# Patient Record
Sex: MALE | NOT HISPANIC OR LATINO
[De-identification: names, ages, dates, MRNs, and addresses within clinical notes are randomized per-mention and may not be internally consistent; named-entity substitution may affect disease eponyms.]

---

## 2023-06-22 ENCOUNTER — FORM ENCOUNTER (OUTPATIENT)
Age: 68
End: 2023-06-22

## 2023-06-23 ENCOUNTER — APPOINTMENT (OUTPATIENT)
Dept: ORTHOPEDIC SURGERY | Facility: CLINIC | Age: 68
End: 2023-06-23
Payer: MEDICARE

## 2023-06-23 ENCOUNTER — APPOINTMENT (OUTPATIENT)
Dept: MRI IMAGING | Facility: CLINIC | Age: 68
End: 2023-06-23
Payer: MEDICARE

## 2023-06-23 VITALS — WEIGHT: 245 LBS | HEIGHT: 71 IN | BODY MASS INDEX: 34.3 KG/M2

## 2023-06-23 DIAGNOSIS — M23.91 UNSPECIFIED INTERNAL DERANGEMENT OF RIGHT KNEE: ICD-10-CM

## 2023-06-23 DIAGNOSIS — Z86.69 PERSONAL HISTORY OF OTHER DISEASES OF THE NERVOUS SYSTEM AND SENSE ORGANS: ICD-10-CM

## 2023-06-23 DIAGNOSIS — Z00.00 ENCOUNTER FOR GENERAL ADULT MEDICAL EXAMINATION W/OUT ABNORMAL FINDINGS: ICD-10-CM

## 2023-06-23 DIAGNOSIS — E11.9 TYPE 2 DIABETES MELLITUS W/OUT COMPLICATIONS: ICD-10-CM

## 2023-06-23 PROCEDURE — 73564 X-RAY EXAM KNEE 4 OR MORE: CPT | Mod: RT

## 2023-06-23 PROCEDURE — 73721 MRI JNT OF LWR EXTRE W/O DYE: CPT | Mod: RT,MH

## 2023-06-23 PROCEDURE — 99204 OFFICE O/P NEW MOD 45 MIN: CPT

## 2023-06-23 RX ORDER — METFORMIN HYDROCHLORIDE 500 MG/1
500 TABLET, COATED ORAL
Refills: 0 | Status: ACTIVE | COMMUNITY

## 2023-06-23 NOTE — DISCUSSION/SUMMARY
[de-identified] : Assessment: The patient is a 67 year old male with [] and physical exam findings consistent with [].\par \par Patient and I discussed their symptoms. Discussed findings of today's exam and possible causes of patient's pain. Educated patient on their most probable diagnosis. Reviewed possible courses of treatment, and we collaboratively decided best course of treatment at this time will include:\par \par 1. MRI R knee to eval MMT \par \par \par The patient's current medication management of their orthopedic diagnosis was reviewed today: \par \par (1) We discussed a comprehensive treatment plan that included possible pharmaceutical management involving the use of prescription strength medications including but not limited to options such as oral Naprosyn 500mg BID, once daily Meloxicam 15 mg, or 500-650 mg Tylenol versus over the counter oral medications and topical prescription NSAID Pennsaid vs over the counter Voltaren gel. \par \par (2) There is a moderate risk of morbidity with further treatment, especially from use of prescription strength medications and possible side effects of these medications which include upset stomach with oral medications, skin reactions to topical medications and cardiac/renal issues with long term use. \par \par (3) I recommended that the patient follow-up with their medical physician to discuss any significant specific potential issues with long term medication use such as interactions with current medications or with exacerbation of underlying medical comorbidities. \par \par (4) The benefits and risks associated with use of injectable, oral or topical, prescription and over the counter anti-inflammatory medications were discussed with the patient. The patient voiced understanding of the risks including but not limited to bleeding, stroke, kidney dysfunction, heart disease, and were referred to the black box warning label for further information.\par \par Follow up after MRI.

## 2023-06-23 NOTE — IMAGING
[de-identified] : The patient is a well appearing 18 year old male of their stated age.\par Patient ambulates with a crutches.\par Negative straight leg raise bilateral\par \par Effected Knee:    RIGHT                	\par ROM:  0-120 degrees\par \par Lachman: Negative\par Pivot Shift: Negative\par Anterior Drawer: Negative\par Posterior Drawer / Sag: Negative\par Varus Stress 0 degrees: Stable\par Varus Stress 30 degrees: Stable\par Valgus Stress 0 degrees: Stable\par Valgus Stress 30 degrees: Stable\par Medial Ras: Positive\par Lateral Ras: Negative\par Patella Glide: 2+\par Patella Apprehension: Negative\par Patella Grind: Negative\par \par Palpation:\par Medial Joint Line: TTP\par Lateral Joint Line: Nontender\par Medial Collateral Ligament: Nontender\par Lateral Collateral Ligament/PLC: Nontender\par Distal Femur: Nontender\par Proximal Tibia: Nontender\par Tibial Tubercle: Nontender\par Distal Pole Patella: Nontender\par Quadriceps Tendon: Nontender &  Intact\par Patella Tendon: Nontender &  Intact\par Medial Distal Hamstring/PES: Nontender\par Lateral Distal Hamstring: Nontender & Stable\par Iliotibial Band: Nontender\par Medial Patellofemoral Ligament: Nontender\par Adductor: Nontender\par Proximal GSC-Plantaris: Nontender\par Calf: Supple & Nontender\par \par Inspection:\par Deformity: No\par Erythema: No\par Ecchymosis: No\par Abrasions: No\par Effusion: Moderate\par Prepatellar Bursitis: No\par \par Neurologic Exam:\par Sensation L4-S1: Grossly Intact\par \par Motor Exam:\par Quadriceps: 5 out of 5\par Hamstrings: 5 out of 5\par EHL: 5 out of 5\par FHL: 5 out of 5\par TA: 5 out of 5\par GS: 5 out of 5\par \par Circulatory/Pulses:\par Dorsalis Pedis: 2+\par Posterior Tibialis: 2+\par \par Additional Pertinent Findings: None\par \par Contralateral Knee:               	\par ROM: 0-130 degrees\par \par Other Pertinent Findings: None\par   [Right] : right knee [There are no fractures, subluxations or dislocations. No significant abnormalities are seen] : There are no fractures, subluxations or dislocations. No significant abnormalities are seen

## 2023-06-23 NOTE — HISTORY OF PRESENT ILLNESS
[8] : 8 [Dull/Aching] : dull/aching [Localized] : localized [Constant] : constant [Standing] : standing [Walking] : walking [Stairs] : stairs [Retired] : Work status: retired [de-identified] : 6/23/23: 66yo M with right knee pain for the past 3 weeks after he had twisted his knee while pushing a wagon through sand. It is constantly painful, but most bothersome with prolonged walking. Hx of L knee scope.  [] : Post Surgical Visit: no [FreeTextEntry1] : Rt knee [FreeTextEntry5] : Patient was being pushed in a wagon through sand, twisted his R knee 3 weeks ago, no prior hx, PT is using a cane

## 2023-06-23 NOTE — DISCUSSION/SUMMARY
[de-identified] : Assessment: The patient is a 67 year old male with [] and physical exam findings consistent with [].\par \par Patient and I discussed their symptoms. Discussed findings of today's exam and possible causes of patient's pain. Educated patient on their most probable diagnosis. Reviewed possible courses of treatment, and we collaboratively decided best course of treatment at this time will include:\par \par 1. MRI R knee to eval MMT \par \par \par The patient's current medication management of their orthopedic diagnosis was reviewed today: \par \par (1) We discussed a comprehensive treatment plan that included possible pharmaceutical management involving the use of prescription strength medications including but not limited to options such as oral Naprosyn 500mg BID, once daily Meloxicam 15 mg, or 500-650 mg Tylenol versus over the counter oral medications and topical prescription NSAID Pennsaid vs over the counter Voltaren gel. \par \par (2) There is a moderate risk of morbidity with further treatment, especially from use of prescription strength medications and possible side effects of these medications which include upset stomach with oral medications, skin reactions to topical medications and cardiac/renal issues with long term use. \par \par (3) I recommended that the patient follow-up with their medical physician to discuss any significant specific potential issues with long term medication use such as interactions with current medications or with exacerbation of underlying medical comorbidities. \par \par (4) The benefits and risks associated with use of injectable, oral or topical, prescription and over the counter anti-inflammatory medications were discussed with the patient. The patient voiced understanding of the risks including but not limited to bleeding, stroke, kidney dysfunction, heart disease, and were referred to the black box warning label for further information.\par \par Follow up after MRI.

## 2023-06-23 NOTE — IMAGING
[de-identified] : The patient is a well appearing 18 year old male of their stated age.\par Patient ambulates with a crutches.\par Negative straight leg raise bilateral\par \par Effected Knee:    RIGHT                	\par ROM:  0-120 degrees\par \par Lachman: Negative\par Pivot Shift: Negative\par Anterior Drawer: Negative\par Posterior Drawer / Sag: Negative\par Varus Stress 0 degrees: Stable\par Varus Stress 30 degrees: Stable\par Valgus Stress 0 degrees: Stable\par Valgus Stress 30 degrees: Stable\par Medial Ras: Positive\par Lateral Ras: Negative\par Patella Glide: 2+\par Patella Apprehension: Negative\par Patella Grind: Negative\par \par Palpation:\par Medial Joint Line: TTP\par Lateral Joint Line: Nontender\par Medial Collateral Ligament: Nontender\par Lateral Collateral Ligament/PLC: Nontender\par Distal Femur: Nontender\par Proximal Tibia: Nontender\par Tibial Tubercle: Nontender\par Distal Pole Patella: Nontender\par Quadriceps Tendon: Nontender &  Intact\par Patella Tendon: Nontender &  Intact\par Medial Distal Hamstring/PES: Nontender\par Lateral Distal Hamstring: Nontender & Stable\par Iliotibial Band: Nontender\par Medial Patellofemoral Ligament: Nontender\par Adductor: Nontender\par Proximal GSC-Plantaris: Nontender\par Calf: Supple & Nontender\par \par Inspection:\par Deformity: No\par Erythema: No\par Ecchymosis: No\par Abrasions: No\par Effusion: Moderate\par Prepatellar Bursitis: No\par \par Neurologic Exam:\par Sensation L4-S1: Grossly Intact\par \par Motor Exam:\par Quadriceps: 5 out of 5\par Hamstrings: 5 out of 5\par EHL: 5 out of 5\par FHL: 5 out of 5\par TA: 5 out of 5\par GS: 5 out of 5\par \par Circulatory/Pulses:\par Dorsalis Pedis: 2+\par Posterior Tibialis: 2+\par \par Additional Pertinent Findings: None\par \par Contralateral Knee:               	\par ROM: 0-130 degrees\par \par Other Pertinent Findings: None\par   [Right] : right knee [There are no fractures, subluxations or dislocations. No significant abnormalities are seen] : There are no fractures, subluxations or dislocations. No significant abnormalities are seen

## 2023-06-23 NOTE — HISTORY OF PRESENT ILLNESS
[8] : 8 [Dull/Aching] : dull/aching [Localized] : localized [Constant] : constant [Standing] : standing [Walking] : walking [Stairs] : stairs [Retired] : Work status: retired [de-identified] : 6/23/23: 66yo M with right knee pain for the past 3 weeks after he had twisted his knee while pushing a wagon through sand. It is constantly painful, but most bothersome with prolonged walking. Hx of L knee scope.  [] : Post Surgical Visit: no [FreeTextEntry1] : Rt knee [FreeTextEntry5] : Patient was being pushed in a wagon through sand, twisted his R knee 3 weeks ago, no prior hx, PT is using a cane

## 2023-06-26 ENCOUNTER — APPOINTMENT (OUTPATIENT)
Dept: ORTHOPEDIC SURGERY | Facility: CLINIC | Age: 68
End: 2023-06-26
Payer: MEDICARE

## 2023-06-26 PROCEDURE — 99443: CPT | Mod: 95

## 2023-06-30 ENCOUNTER — APPOINTMENT (OUTPATIENT)
Dept: ORTHOPEDIC SURGERY | Facility: CLINIC | Age: 68
End: 2023-06-30
Payer: MEDICARE

## 2023-06-30 DIAGNOSIS — M23.203 DERANGEMENT OF UNSPECIFIED MEDIAL MENISCUS DUE TO OLD TEAR OR INJURY, RIGHT KNEE: ICD-10-CM

## 2023-06-30 PROCEDURE — 99214 OFFICE O/P EST MOD 30 MIN: CPT | Mod: 25

## 2023-06-30 PROCEDURE — 20611 DRAIN/INJ JOINT/BURSA W/US: CPT | Mod: RT

## 2023-06-30 RX ORDER — ROSUVASTATIN CALCIUM 5 MG/1
TABLET, FILM COATED ORAL
Refills: 0 | Status: ACTIVE | COMMUNITY

## 2023-06-30 RX ORDER — GABAPENTIN 300 MG
300 TABLET ORAL
Refills: 0 | Status: ACTIVE | COMMUNITY

## 2023-06-30 RX ORDER — TELMISARTAN 20 MG/1
20 TABLET ORAL
Refills: 0 | Status: ACTIVE | COMMUNITY

## 2023-06-30 RX ORDER — OXYCODONE HYDROCHLORIDE 30 MG/1
TABLET ORAL
Refills: 0 | Status: ACTIVE | COMMUNITY

## 2023-06-30 RX ORDER — NEBIVOLOL 5 MG/1
5 TABLET ORAL
Refills: 0 | Status: ACTIVE | COMMUNITY

## 2023-07-03 NOTE — DISCUSSION/SUMMARY
[de-identified] : Assessment: The patient is a 67 year old male with Right Knee Pain and physical exam findings consistent with internal derangement \par \par Patient and I discussed their symptoms. Discussed findings of today's exam and possible causes of patient's pain. Educated patient on their most probable diagnosis. Reviewed possible courses of treatment, and we collaboratively decided best course of treatment at this time will include:\par \par 1. CSI Injection was done today, patient tolerated injection well\par 2. Apply ice to affected area, as needed\par 3. Start PT and HEP \par \par The patient's current medication management of their orthopedic diagnosis was reviewed today: \par \par (1) We discussed a comprehensive treatment plan that included possible pharmaceutical management involving the use of prescription strength medications including but not limited to options such as oral Naprosyn 500mg BID, once daily Meloxicam 15 mg, or 500-650 mg Tylenol versus over the counter oral medications and topical prescription NSAID Pennsaid vs over the counter Voltaren gel. \par \par (2) There is a moderate risk of morbidity with further treatment, especially from use of prescription strength medications and possible side effects of these medications which include upset stomach with oral medications, skin reactions to topical medications and cardiac/renal issues with long term use. \par \par (3) I recommended that the patient follow-up with their medical physician to discuss any significant specific potential issues with long term medication use such as interactions with current medications or with exacerbation of underlying medical comorbidities. \par \par (4) The benefits and risks associated with use of injectable, oral or topical, prescription and over the counter anti-inflammatory medications were discussed with the patient. The patient voiced understanding of the risks including but not limited to bleeding, stroke, kidney dysfunction, heart disease, and were referred to the black box warning label for further information.\par \par Prior to appointment and during encounter with patient extensive medical records were reviewed including but not limited to, hospital records, out patient records, imaging results, and lab data. During this appointment the patient was examined, diagnoses were discussed and explained in a face to face manner. In addition extensive time was spent reviewing aforementioned diagnostic studies. Counseling including abnormal image results, differential diagnoses, treatment options, risk and benefits, lifestyle changes, current condition, and current medications was performed. Patient's comments, questions, and concerns were address and patient verbalized understanding.\par \par Follow up in 3 months.

## 2023-07-03 NOTE — DATA REVIEWED
[FreeTextEntry1] : [facility] MRI RIGHT KNEE IMPRESSIONS: 06/23/23\par 1. Findings consistent with a recurrent tear involving the peripheral undersurface of the posterior horn and body of the medial meniscus, where there are areas of subchondral edema in the peripheral posterior medial compartment potentially related to stress reaction, contusions or degenerative subchondral edema with associated chondral loss and joint space narrowing in the medial compartment along the periphery and posteriorly.\par 2. Mild MCL sprain, mild medial patellofemoral ligament sprain and soft tissue swelling anteriorly and \par medially.\par 3. Chondral loss in the lateral and patellofemoral compartments with mild subchondral edema in the lateral \par patella and lateral femoral trochlea.\par 4. Effusion, synovitis and popliteal cyst.\par 5. Clinical correlation regarding prior surgery is recommended. Consider MR arthrogram to further evaluate as clinically indicated.

## 2023-07-03 NOTE — PROCEDURE
[FreeTextEntry3] : Patient Identification \par Name/: Verbal with patient and/or family \par  \par Procedure Verification: \par Procedure confirmed with patient or family/designee \par Consent for procedure: Verbal Consent Given \par Relevant documentation completed, reviewed, and signed \par Clinical indications for procedure confirmed \par  \par Time-out with all members of procedure team immediately prior to procedure: \par Correct patient identified. Agreement on procedure. Correct side and site. \par  \par ULTRASOUND GUIDED KNEE INJECTION (STEROID) - RIGHT\par After verbal consent and identification of the correct patient and correct site, the RIGHT superolateral knees were prepped using alcohol swabs and betadine. This was allowed time to air dry. A mixture of 1cc Celestone 6mg/ml, 2cc Lidocaine 1%, and 2cc Bupivacaine 0.5% was injected into the suprapatellar pouch using a sterile 22G needle with US after ethyl chloride spray for skin anesthesia. The patient tolerated the procedure well. After-care instructions were provided and included instructions to ice the area and to call if redness, pain, or fever develop. Visualization of the needle and placement of the injection was performed without any complications. Ultrasound was used for visualization, precise injection in area of tear, and / or prior failure or difficult injection.\par The risks, benefits, and alternatives to corticosteroid injection were reviewed with the patient. Risks outlined include but are not limited to infection, sepsis, bleeding, scarring, skin discoloration, temporary increase in pain, syncopal episode, failure to resolve symptoms, symptoms recurrence, allergic reaction, flare reaction, and elevation of blood sugar in diabetics. Patient understood the risks and asked to proceed with this treatment course.\par

## 2023-07-03 NOTE — HISTORY OF PRESENT ILLNESS
[8] : 8 [Dull/Aching] : dull/aching [Localized] : localized [Constant] : constant [Standing] : standing [Walking] : walking [Stairs] : stairs [Retired] : Work status: retired [de-identified] : 06/30/2023: MORGAN is presenting today for followup for Right Knee Pain. Pain and symptoms are the same from the last visit. He has been resting and limiting activities. He denies any numbness/tingling/fevers/chills.\par  [] : Post Surgical Visit: no [FreeTextEntry1] : Rt knee [FreeTextEntry5] : Patient was being pushed in a wagon through sand, twisted his R knee 3 weeks ago, no prior hx, PT is using a cane [de-identified] : NONE

## 2023-07-03 NOTE — IMAGING
[de-identified] : The patient is a well appearing 18 year old male of their stated age.\par Patient ambulates with a crutches.\par Negative straight leg raise bilateral\par \par Effected Knee:    RIGHT                	\par ROM:  0-120 degrees\par \par Lachman: Negative\par Pivot Shift: Negative\par Anterior Drawer: Negative\par Posterior Drawer / Sag: Negative\par Varus Stress 0 degrees: Stable\par Varus Stress 30 degrees: Stable\par Valgus Stress 0 degrees: Stable\par Valgus Stress 30 degrees: Stable\par Medial Ras: Positive\par Lateral Ras: Negative\par Patella Glide: 2+\par Patella Apprehension: Negative\par Patella Grind: Negative\par \par Palpation:\par Medial Joint Line: TTP\par Lateral Joint Line: Nontender\par Medial Collateral Ligament: Nontender\par Lateral Collateral Ligament/PLC: Nontender\par Distal Femur: Nontender\par Proximal Tibia: Nontender\par Tibial Tubercle: Nontender\par Distal Pole Patella: Nontender\par Quadriceps Tendon: Nontender &  Intact\par Patella Tendon: Nontender &  Intact\par Medial Distal Hamstring/PES: Nontender\par Lateral Distal Hamstring: Nontender & Stable\par Iliotibial Band: Nontender\par Medial Patellofemoral Ligament: Nontender\par Adductor: Nontender\par Proximal GSC-Plantaris: Nontender\par Calf: Supple & Nontender\par \par Inspection:\par Deformity: No\par Erythema: No\par Ecchymosis: No\par Abrasions: No\par Effusion: Moderate\par Prepatellar Bursitis: No\par \par Neurologic Exam:\par Sensation L4-S1: Grossly Intact\par \par Motor Exam:\par Quadriceps: 5 out of 5\par Hamstrings: 5 out of 5\par EHL: 5 out of 5\par FHL: 5 out of 5\par TA: 5 out of 5\par GS: 5 out of 5\par \par Circulatory/Pulses:\par Dorsalis Pedis: 2+\par Posterior Tibialis: 2+\par \par Additional Pertinent Findings: None\par \par Contralateral Knee:               	\par ROM: 0-130 degrees\par \par Other Pertinent Findings: None\par

## 2024-08-15 ENCOUNTER — APPOINTMENT (OUTPATIENT)
Dept: ORTHOPEDIC SURGERY | Facility: CLINIC | Age: 69
End: 2024-08-15
Payer: MEDICARE

## 2024-08-15 DIAGNOSIS — S93.492A SPRAIN OF OTHER LIGAMENT OF LEFT ANKLE, INITIAL ENCOUNTER: ICD-10-CM

## 2024-08-15 PROCEDURE — 99213 OFFICE O/P EST LOW 20 MIN: CPT

## 2024-08-15 PROCEDURE — 99203 OFFICE O/P NEW LOW 30 MIN: CPT

## 2024-08-15 PROCEDURE — L4350: CPT | Mod: KX,LT

## 2024-08-15 RX ORDER — ASPIRIN 81 MG
81 TABLET, DELAYED RELEASE (ENTERIC COATED) ORAL
Refills: 0 | Status: ACTIVE | COMMUNITY

## 2024-08-15 RX ORDER — CLOPIDOGREL 75 MG/1
75 TABLET, FILM COATED ORAL
Refills: 0 | Status: ACTIVE | COMMUNITY

## 2024-08-15 NOTE — HISTORY OF PRESENT ILLNESS
[8] : 8 [Dull/Aching] : dull/aching [Constant] : constant [de-identified] : 08/15/2024 MORGAN ARCE a 68 year old male here for evaluation of his left ankle. Patient states he slipped 08/10 and hurt his ankle. Went to an Urgent Care had x-ray taken which showed no fxs. Pain localized along the lateral ankle. Denies trauma/previous injury. WB in sneakers.   Hx of DM  A1C 6.2 [] : no [FreeTextEntry1] : left ankle

## 2024-08-15 NOTE — DATA REVIEWED
[Outside X-rays] : outside x-rays [Ankle] : ankle [Report was reviewed and noted in the chart] : The report was reviewed and noted in the chart [I independently reviewed and interpreted images and report] : I independently reviewed and interpreted images and report [FreeTextEntry1] : 8/12/24  No fx

## 2024-09-17 ENCOUNTER — APPOINTMENT (OUTPATIENT)
Dept: ORTHOPEDIC SURGERY | Facility: CLINIC | Age: 69
End: 2024-09-17
Payer: MEDICARE

## 2024-09-17 ENCOUNTER — APPOINTMENT (OUTPATIENT)
Dept: MRI IMAGING | Facility: CLINIC | Age: 69
End: 2024-09-17
Payer: MEDICARE

## 2024-09-17 DIAGNOSIS — R73.09 OTHER ABNORMAL GLUCOSE: ICD-10-CM

## 2024-09-17 PROBLEM — S93.492D SPRAIN OF ANTERIOR TALOFIBULAR LIGAMENT OF LEFT ANKLE, SUBSEQUENT ENCOUNTER: Status: ACTIVE | Noted: 2024-09-17

## 2024-09-17 PROCEDURE — 73721 MRI JNT OF LWR EXTRE W/O DYE: CPT | Mod: LT,MH

## 2024-09-17 PROCEDURE — 99213 OFFICE O/P EST LOW 20 MIN: CPT

## 2024-09-17 NOTE — HISTORY OF PRESENT ILLNESS
[de-identified] : 08/15/2024 MORGAN ARCE a 68 year old male here for evaluation of his left ankle. Patient states he slipped 08/10 and hurt his ankle. Went to an Urgent Care had x-ray taken which showed no fxs. Pain localized along the lateral ankle. Denies trauma/previous injury. WB in sneakers.   Hx of DM  A1C 6.2  9/17/24: f/u L ankle; wbat in airsport brace. PT/HEP with some improvement. Pain and swelling persists. radiates to knee

## 2024-09-17 NOTE — PHYSICAL EXAM
[Left] : left foot and ankle [Mild] : mild diffused ankle swelling [] : no medial ankle joint line tenderness

## 2024-09-24 ENCOUNTER — APPOINTMENT (OUTPATIENT)
Dept: ORTHOPEDIC SURGERY | Facility: CLINIC | Age: 69
End: 2024-09-24
Payer: MEDICARE

## 2024-09-24 DIAGNOSIS — M84.30XA STRESS FRACTURE, UNSPECIFIED SITE, INITIAL ENCOUNTER FOR FRACTURE: ICD-10-CM

## 2024-09-24 DIAGNOSIS — S93.492D SPRAIN OF OTHER LIGAMENT OF LEFT ANKLE, SUBSEQUENT ENCOUNTER: ICD-10-CM

## 2024-09-24 DIAGNOSIS — M14.672 CHARCOT'S JOINT, LEFT ANKLE AND FOOT: ICD-10-CM

## 2024-09-24 DIAGNOSIS — M19.072 PRIMARY OSTEOARTHRITIS, LEFT ANKLE AND FOOT: ICD-10-CM

## 2024-09-24 PROCEDURE — 99213 OFFICE O/P EST LOW 20 MIN: CPT

## 2024-09-24 NOTE — PHYSICAL EXAM
[Left] : left foot and ankle [Mild] : mild diffused ankle swelling [] : no medial ankle joint line tenderness [2+] : dorsalis pedis pulse: 2+ [[id number]] : dorsiflexion 15 degrees [de-identified] : plantar flexion 30 degrees [de-identified] : inversion 20 degrees [de-identified] : eversion 15 degrees

## 2024-09-24 NOTE — HISTORY OF PRESENT ILLNESS
[de-identified] : 08/15/2024 MORGAN ARCE a 68 year old male here for evaluation of his left ankle. Patient states he slipped 08/10 and hurt his ankle. Went to an Urgent Care had x-ray taken which showed no fxs. Pain localized along the lateral ankle. Denies trauma/previous injury. WB in sneakers.   Hx of DM  A1C 6.2  9/17/24: f/u L ankle; wbat in airsport brace. PT/HEP with some improvement. Pain and swelling persists. radiates to knee 09/24/2024 Patient is here for MRI Results. WB in airsport

## 2024-09-24 NOTE — DATA REVIEWED
[MRI] : MRI [Left] : left [Ankle] : ankle [I independently reviewed and interpreted images and report] : I independently reviewed and interpreted images and report [I reviewed the films/CD and agree] : I reviewed the films/CD and agree [FreeTextEntry1] : 9/14/24:  severe intense marrow edema throughout the talus; degenerative changes throughout the tibiotalar joint with small OCD's.

## 2024-09-24 NOTE — DISCUSSION/SUMMARY
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